# Patient Record
Sex: MALE | Race: WHITE
[De-identification: names, ages, dates, MRNs, and addresses within clinical notes are randomized per-mention and may not be internally consistent; named-entity substitution may affect disease eponyms.]

---

## 2017-02-28 ENCOUNTER — HOSPITAL ENCOUNTER (OUTPATIENT)
Dept: HOSPITAL 39 - GMAJ | Age: 72
Discharge: HOME | End: 2017-02-28
Attending: FAMILY MEDICINE
Payer: MEDICARE

## 2017-02-28 DIAGNOSIS — Z12.5: Primary | ICD-10-CM

## 2017-04-28 ENCOUNTER — HOSPITAL ENCOUNTER (OUTPATIENT)
Dept: HOSPITAL 39 - CT | Age: 72
Discharge: HOME | End: 2017-04-28
Attending: NURSE PRACTITIONER
Payer: MEDICARE

## 2017-04-28 DIAGNOSIS — C34.90: Primary | ICD-10-CM

## 2017-04-28 NOTE — MRI
EXAM DESCRIPTION:



Brain w/wo Contrast





CLINICAL HISTORY:



72 years,Male,MALIGNANT NEOPLASM OF UNSPECIFIED BRONCHUS OR LUNG





COMPARISON:



None





TECHNIQUE:



MRI performed multiple sequences of the brain with and without

gadolinium contrast .





FINDINGS:



There is no abnormal extra-axial fluid collection, hemorrhage, or

enhancing masses.



Gray-white matter differentiation is unremarkable for age. No

enhancing masses. Sulcation and ventricles are age appropriate.



The included paranasal sinuses and mastoid air cells, are

unremarkable. 



Diffusion demonstrates no acute findings.



There is a 2.7 cm subcutaneous cyst seen in the left of midline

of the upper neck





IMPRESSION:



Unremarkable MRI of the brain.



Simple cyst seen in upper left to the midline of the upper neck



Electronically signed by:  Anastacio Banerjee MD  4/28/2017 1:35 PM CDT

## 2017-04-28 NOTE — CT
EXAM DESCRIPTION:



Abdomen/Pelvis w/Contrast



CLINICAL HISTORY:



72 years,Male,MALIGNANT NEOPLASM OF UNSPECIFIED PART OF

UNSPECIFIED LUNG



COMPARISON:



None





TECHNIQUE:



Multiple axial tomographic images were obtained of the abdomen

and pelvis with IV contrast and no oral contrast. Then

reconstructed in sagittal and coronal planes. 





FINDINGS:



The kidneys multiple cysts in both kidneys over 10 in each. Some

are too small to characterize. But some larger ones of the 3 cm

in size in the right kidney and 2.1 cm in the left kidney. A tiny

3 mm stone in the midpole right kidney nonobstructing. The

adrenal glands demonstrate a small mass and left adrenal gland

measuring 2.2 x 1.7 cm and 36 Hounsfield units. This is seen and

CT of the chest December 9, 2015 and March 10, 2015 and a abdomen

CT from May 27, 2014. And appear unchanged. As well as the renal

cyst appear unchanged.. 



The spleen is unremarkable. The liver is unremarkable. 



The pancreas is unremarkable.  The gallbladder is unremarkable. 



The included bowel is demonstrates a moderate stool otherwise

unremarkable.



The appendix is unremarkable.



There is no free air, free fluid, masses, or significant

adenopathy. 



Surrounding soft tissues and bony elements unremarkable. 



Lung bases on the very first exam demonstrate an area about 2 cm

in size and interstitial changes in the posterior left base.



Prostate is enlarged to 7 x 6.8 x 7.4 cm in height.





IMPRESSION:



Left adrenal mass which is stable for several years and most

likely adenoma.



Multiple renal cysts stable and a small right renal stone

nonobstructing.



Probable mild constipation and severe prostate hypertrophy.



Partially included in the exam is a small by area of the

interstitial changes in the posterior left lung base. This could

be anything from infiltrate, scarring or even neoplasm. Recommend

follow-up







Electronically signed by:  Anastacio Banerjee MD  4/28/2017 1:47 PM CDT

## 2017-05-03 ENCOUNTER — HOSPITAL ENCOUNTER (OUTPATIENT)
Dept: HOSPITAL 39 - CT | Age: 72
Discharge: HOME | End: 2017-05-03
Attending: NURSE PRACTITIONER
Payer: MEDICARE

## 2017-05-03 DIAGNOSIS — C34.90: Primary | ICD-10-CM

## 2017-05-03 NOTE — CT
PROCEDURE:  Chest w/Contrast



CLINICAL HISTORY:  72 years  Male  MALIGNANT NEOPLASM OF

UNSPECIFIED PART OF UNSPECIFIED BRONCHUS. The study is for

follow-up of previously visualized lung lesion.



COMPARISON:  CT chest study from 11/17/2016.



TECHNIQUE:  Contiguous axial images obtained through the chest

during the infusion of IV contrast. Reformatted images obtained.



This exam was performed according to our department optimization

program which includes automated exposure control, adjustment of

the mA and/or kv according to patient size and/or use of

iterative reconstruction technique.   



FINDINGS:



Stable left adrenal gland nodule measuring approximately 2.2 cm

in diameter. Stable renal cysts. Nonobstructing renal

calcifications which could be vascular.



Left chest port tip in the SVC.



Coronary artery calcifications/stents. Changes from previous

sternotomy.



The mediastinum appears unremarkable.



Atherosclerotic calcifications in the aorta and its branches.



There is a solid lesion in the left lower lung measuring

approximately 2.1 x 1.7 cm x 1.8 cm. This compares with

measurements on the previous study of approximately 1.3 x 1 x 1.8

cm. There is a small amount of surrounding interstitial

infiltrate with mild cystic change in the adjacent lung.



Degenerative changes in the spine.



IMPRESSION:



Solid lesion in the left lower lobe of the lung which is slightly

larger in size compared to the previous study. The findings

consistent with malignancy. No definite enlarged hilar or

mediastinal lymph nodes.



Stable left adrenal gland nodular lesion.



Electronically signed by:  Ivan Rebolledo MD  5/3/2017 5:00 PM CDT

## 2017-06-01 ENCOUNTER — HOSPITAL ENCOUNTER (OUTPATIENT)
Dept: HOSPITAL 39 - AMB | Age: 72
Discharge: HOME | End: 2017-06-01
Attending: SURGERY
Payer: MEDICARE

## 2017-06-01 VITALS — DIASTOLIC BLOOD PRESSURE: 76 MMHG | SYSTOLIC BLOOD PRESSURE: 128 MMHG | OXYGEN SATURATION: 100 %

## 2017-06-01 VITALS — TEMPERATURE: 97 F

## 2017-06-01 DIAGNOSIS — N40.0: ICD-10-CM

## 2017-06-01 DIAGNOSIS — I25.10: ICD-10-CM

## 2017-06-01 DIAGNOSIS — Z79.899: ICD-10-CM

## 2017-06-01 DIAGNOSIS — Z85.118: ICD-10-CM

## 2017-06-01 DIAGNOSIS — E78.5: ICD-10-CM

## 2017-06-01 DIAGNOSIS — I25.2: ICD-10-CM

## 2017-06-01 DIAGNOSIS — C77.0: Primary | ICD-10-CM

## 2017-06-01 DIAGNOSIS — K21.9: ICD-10-CM

## 2017-06-01 DIAGNOSIS — Z95.1: ICD-10-CM

## 2017-06-01 PROCEDURE — 88341 IMHCHEM/IMCYTCHM EA ADD ANTB: CPT

## 2017-06-01 PROCEDURE — 88360 TUMOR IMMUNOHISTOCHEM/MANUAL: CPT

## 2017-06-01 PROCEDURE — 88305 TISSUE EXAM BY PATHOLOGIST: CPT

## 2017-06-01 PROCEDURE — 80320 DRUG SCREEN QUANTALCOHOLS: CPT

## 2017-06-01 PROCEDURE — 00320 ANES ALL PX NECK NOS 1YR/>: CPT

## 2017-06-01 PROCEDURE — 36415 COLL VENOUS BLD VENIPUNCTURE: CPT

## 2017-06-01 PROCEDURE — 80053 COMPREHEN METABOLIC PANEL: CPT

## 2017-06-01 PROCEDURE — 93005 ELECTROCARDIOGRAM TRACING: CPT

## 2017-06-01 PROCEDURE — 71020: CPT

## 2017-06-01 PROCEDURE — 38500 BIOPSY/REMOVAL LYMPH NODES: CPT

## 2017-06-01 PROCEDURE — 81001 URINALYSIS AUTO W/SCOPE: CPT

## 2017-06-01 PROCEDURE — 88342 IMHCHEM/IMCYTCHM 1ST ANTB: CPT

## 2017-06-01 PROCEDURE — 85025 COMPLETE CBC W/AUTO DIFF WBC: CPT

## 2017-06-01 NOTE — OP
DATE OF PROCEDURE:  06/01/17



PREOPERATIVE DIAGNOSIS: 

1.  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 
the lung.



POSTOPERATIVE DIAGNOSIS: 

1.  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 
the left.



PROCEDURE: 

1.  Excision, left supraclavicular lymph node.



SURGEON:  Donald A. Behr, MD.



ASSISTANT:  None.



ANESTHESIA:  Local infiltration of 1% lidocaine with bicarb and IV sedation by 
Anesthesia.



INDICATION:  The patient is a 72-year-old male who developed small cell 
carcinoma several years ago.  He was treated.  It was felt to have recurred and 
placed on hospice.  However, there was no significant progression of disease 
and he has been followed by Dr. Garcia.  He has now developed left 
supraclavicular lymphadenopathy.  He was brought to the Surgical Suite today 
for excision of same after the risks, benefits and alternatives to the 
procedure were discussed and accepted.  The procedure is to delineate whether 
this is small cell or if it has become a non-small cell carcinoma.  



FINDINGS:  A single firm mass consistent with lymphadenopathy was removed.  
Pathology is pending.



PROCEDURE:  After the patient was brought t the Surgical Suite and placed in 
supine position, he was prepped and draped in the usual sterile manner.  
Surgical time-out was taken.  IV sedation was performed.  The left 
supraclavicular area was identified and incision was made first with a marking 
pen and then with infiltration of anesthesia.  The skin was incised with a 
sharp knife.  Subcutaneous tissue and platysma were divided using electrocautery
, then using blunt dissection, electrocautery, clamps and ligatures of 3-0 
Vicryl, the lymph node was dissected free and sent for pathologic evaluation.  
The wound was irrigated with saline.  Hemostasis was noted to be adequate.  The 
platysma was then closed with interrupted 3-0 Vicryl sutures.  The skin edges 
were approximated with 4-0 Vicryl subcuticular sutures, benzoin, and Steri-
Strips.  Sterile dressings were applied.  The patient was awakened and taken to 
the Ambulatory Unit in stable condition.  Estimated blood loss was less than 25 
mL.  All sponge, needle and instrument counts were correct.



#[id number]
[facility]

## 2017-06-01 NOTE — RAD
Procedure:  XR CHEST 2 VIEWS        



Exam Date:  6/1/2017



Ordering Provider:  DONALD BEHR



Clinical Indication:  Pre Op for 6./1/17



Comparison: 5/3/2017 CT chest



Findings: 

Postsurgical changes at the anterior chest wall. Left chest

portacatheter in place.

Cardiac silhouette: Normal

Pulmonary vasculature : Normal

Mediastinal contour: Normal 

Aortic contour: Aortic calcification. 

Focal lung consolidation: No focal lung consolidation. Known left

lower lobe lesion better appreciated on comparison CT.

Pleural effusion: None

Pneumothorax: None

Acute bony or soft tissue abnormality: None



Impression: 

1. No acute abnormalities in the chest.



Electronically signed by:  Hans Lafleur MD  6/1/2017 7:20 AM CDT

Workstation: [id number]

## 2017-09-30 ENCOUNTER — HOSPITAL ENCOUNTER (EMERGENCY)
Dept: HOSPITAL 39 - ER | Age: 72
Discharge: HOME | End: 2017-09-30
Payer: MEDICARE

## 2017-09-30 VITALS — DIASTOLIC BLOOD PRESSURE: 74 MMHG | OXYGEN SATURATION: 97 % | TEMPERATURE: 97.9 F | SYSTOLIC BLOOD PRESSURE: 138 MMHG

## 2017-09-30 DIAGNOSIS — J44.9: ICD-10-CM

## 2017-09-30 DIAGNOSIS — N40.1: Primary | ICD-10-CM

## 2017-09-30 DIAGNOSIS — Z87.891: ICD-10-CM

## 2017-09-30 DIAGNOSIS — C34.90: ICD-10-CM

## 2017-09-30 DIAGNOSIS — R33.8: ICD-10-CM

## 2017-09-30 DIAGNOSIS — I25.2: ICD-10-CM

## 2017-09-30 NOTE — ED.PDOC
History of Present Illness





- General


Chief Complaint:  Problem


Stated Complaint: Unable to urinate


Time Seen by Provider: 17 04:41


Source: patient


Exam Limitations: no limitations





- History of Present Illness


Initial Comments: 





Patient presents with urinary retention since just after midnight. He has a 

history of BPH and takes flomax. He says that he has not had this problem in 

the two years that he has been taking Flomax.  He urinated several times this 

evening with no problems. No dysuria.  He is currently being treated with 

immunotherapy for small cell lung CA.  No other complaints. No hx of surgery on 

the prostate. 


Timing/Duration: 4-6 hours


Severity: moderate


Improving Factors: nothing


Worsening Factors: nothing


Associated Symptoms: denies symptoms


Allergies/Adverse Reactions: 


Allergies





NO KNOWN ALLERGY Allergy (Verified 14 15:03)


 








Home Medications: 


Ambulatory Orders





Gabapentin 1,200 mg PO BID 17 


Tamsulosin HCl [Flomax] 0.4 mg PO BEDTIME 17 











Review of Systems





- Review of Systems


Constitutional: States: no symptoms reported


EENTM: States: no symptoms reported


Respiratory: States: no symptoms reported


Cardiology: States: no symptoms reported


Gastrointestinal/Abdominal: States: no symptoms reported


Genitourinary: States: see HPI


Musculoskeletal: States: no symptoms reported


Skin: States: no symptoms reported


Neurological: States: no symptoms reported





Past Medical History (General)





- Patient Medical History


Hx Seizures: No


Hx Stroke: No


Hx Dementia: No


Hx Asthma: No


Hx of COPD: Yes


Hx Cardiac Disorders: Yes - MI x 5


Hx Congestive Heart Failure: No


Hx Hypertension: No


Hx Thyroid Disease: No


Hx Diabetes: No


Hx Gastroesophageal Reflux: No


Hx Renal Disease: No


Hx Cancer: Yes - lung currently being treated


Hx of HIV: No


Hx MRSA: No


Surgical History: other





- Vaccination History


Hx Tetanus, Diphtheria Vaccination: No


Hx Influenza Vaccination: No


Hx Pneumococcal Vaccination: No


Immunizations Up to Date: Yes





- Social History


Hx Tobacco Use: Yes


Hx Alcohol Use: Yes





Family Medical History





- Family History


  ** Mother


Family History: Unknown


Living Status: 





Physical Exam





- Physical Exam


General Appearance: Alert


Respiratory: lungs clear


Cardiovascular/Chest: normal peripheral pulses, regular rate, rhythm, no edema


Gastrointestinal/Abdominal: normal bowel sounds, non tender, soft


Skin Exam: normal color





Progress





- Progress


Progress: 





17 04:47


Baptiste catheter inserted and 900 cc urine output obtained.  UA unremarkable.


17 05:05


Patient taught to self cath and provided with kit.  follow up with pcp





Departure





- Departure


Clinical Impression: 


 Urinary retention due to benign prostatic hyperplasia





Disposition: Discharge to Home or Self Care


Condition: Good


Departure Forms:  ED Discharge - Pt. Copy, Patient Portal Self Enrollment


Diet: resume usual diet


Activity: increase activity as tolerated


Referrals: 


Gautam Ayala MD [Primary Care Provider] - 1-2 Weeks


Home Medications: 


Ambulatory Orders





Gabapentin 1,200 mg PO BID 17 


Tamsulosin HCl [Flomax] 0.4 mg PO BEDTIME 17 








Additional Instructions: 


Use self-catheter kit as needed. See your primary care physician on monday.

## 2017-11-09 ENCOUNTER — HOSPITAL ENCOUNTER (EMERGENCY)
Dept: HOSPITAL 39 - ER | Age: 72
Discharge: HOME | End: 2017-11-09
Payer: MEDICARE

## 2017-11-09 VITALS — OXYGEN SATURATION: 96 % | SYSTOLIC BLOOD PRESSURE: 103 MMHG | DIASTOLIC BLOOD PRESSURE: 63 MMHG

## 2017-11-09 VITALS — TEMPERATURE: 99.8 F

## 2017-11-09 DIAGNOSIS — Z87.891: ICD-10-CM

## 2017-11-09 DIAGNOSIS — C34.90: ICD-10-CM

## 2017-11-09 DIAGNOSIS — J44.9: ICD-10-CM

## 2017-11-09 DIAGNOSIS — I25.2: ICD-10-CM

## 2017-11-09 DIAGNOSIS — N13.6: Primary | ICD-10-CM

## 2017-11-09 PROCEDURE — 81001 URINALYSIS AUTO W/SCOPE: CPT

## 2017-11-09 PROCEDURE — 85025 COMPLETE CBC W/AUTO DIFF WBC: CPT

## 2017-11-09 PROCEDURE — 36415 COLL VENOUS BLD VENIPUNCTURE: CPT

## 2017-11-09 PROCEDURE — 80053 COMPREHEN METABOLIC PANEL: CPT

## 2017-11-09 PROCEDURE — 74176 CT ABD & PELVIS W/O CONTRAST: CPT

## 2017-11-09 PROCEDURE — 87086 URINE CULTURE/COLONY COUNT: CPT

## 2017-11-09 NOTE — CT
EXAM: Abdoment/Pelvis w/o Contrast



CLINICAL INDICATION: 72-year-old male with RIGHT lower quadrant

pain with rebound.



COMPARISON: 4/20/2017 CT abdomen and pelvis.



EXAMINATION: CT of the abdomen and pelvis was performed without

intravenous or oral contrast. Multiplanar reformatted images were

provided. This exam was performed according to our departmental

dose optimization program which includes use of automated

exposure control, adjustment of the mA and/or kV according to

patient size and/or use of iterative reconstruction technique.



FINDINGS:



Evaluation of solid organ pathology is limited secondary to lack

of intravenous contrast. Within these limitations, the following

observations are made.



Chest: Evaluation through the lung bases reveals no focal

opacity, pleural effusion or pneumothorax. Heart size is within

normal limits. No pericardial effusion.



Abdomen and pelvis: The liver, gallbladder, pancreas, spleen, and

bilateral adrenal glands are within normal limits. Extensive

perinephric, periureteral stranding with moderate right-sided

hydronephrosis secondary to distal obstructing calculus measuring

2 x 3 mm, (series 2, image 54). Superimposed infectious process

cannot be excluded on a noncontrast CT examination.



Trace volume of free fluid within the dependent pelvis.



Bilateral renal cystic type structures are present the largest of

which is present within the inferior pole of the RIGHT kidney

measuring up to 29 mm and the largest lesion present within the

LEFT side kidney measuring 17 mm with Hounsfield units measuring

less than 20 compatible with simple renal cyst.



The vessels reveal atherosclerotic change otherwise normal in

caliber.



No abdominopelvic lymph nodes are noted to be pathologically

enlarged by CT measurement criteria.



The large and small bowel is within normal limits without

abnormal bowel wall thickness or bowel dilation.



No free air. No organizing abdominopelvic fluid collections. The

appendix is within normal limits.



The osseous structures reveal degenerative change. Anterior

sternal and costochondral multihole plate and screw device. Small

bilateral fat-containing inguinal hernia. Extensive enlargement

of the prostate gland measuring 7.4 x 7.1 x 7.6 cm.



IMPRESSION:



1. Extensive perinephric, periureteral stranding with moderate

right-sided hydronephrosis secondary to distal obstructing

calculus measuring 2 x 3 mm.

2. Superimposed infectious process cannot be excluded on a

noncontrast CT examination.

3. Extensive enlargement of the prostate gland measuring 7.4 x

7.1 x 7.6 cm.

4. Trace volume of free fluid present within the dependent

pelvis.

5. The appendix is within normal limits.



Electronically signed by:  Sapna Molina MD  11/9/2017 8:40 PM CST

Workstation: [id number]

## 2017-11-09 NOTE — ED.PDOC
History of Present Illness





- General


Chief Complaint: Abdominal Pain


Stated Complaint: R abdominal pain


Time Seen by Provider: 17 17:37


Information Source: patient, RN notes reviewed, Vital Signs reviewed, family - 

wife


Exam Limitations: no limitations





- History of Present Illness


Initial Comments: 





Patient presents to the ER with c/o RLQ pain with nausea. Had a brief episode 

of pain yesterday but went away. Pain came back worse today around 14:00. Pain 

is stabbing, constant and worse with movement. No urinary symptoms or change in 

bowel habits. No fever or chills. Last at @~14:00.


Abdominal Pain Onset Location: RLQ


Pain Radiation: no radiation


Quality: severe, sharpness, steady


Timing/Duration: 4-6 hours


Improving Factors: nothing


Worsening Factors: movement


Associated Symptoms: nausea/vomiting





Review of Systems





- Review of Systems


Constitutional: States: no symptoms reported


Respiratory: States: no symptoms reported


Cardiology: States: no symptoms reported


Gastrointestinal/Abdominal: States: see HPI, abdominal pain, constipation, 

nausea.  Denies: diarrhea, vomiting


Genitourinary: States: no symptoms reported.  Denies: dysuria, frequency, pain


Musculoskeletal: States: no symptoms reported


Skin: States: no symptoms reported


Neurological: States: no symptoms reported


All other Systems: No Change from Baseline





Past Medical History (General)





- Patient Medical History


Hx Seizures: No


Hx Stroke: No


Hx Dementia: No


Hx Asthma: No


Hx of COPD: Yes


Hx Cardiac Disorders: Yes - MI x 5


Hx Congestive Heart Failure: No


Hx Hypertension: No


Hx Thyroid Disease: No


Hx Diabetes: No


Hx Gastroesophageal Reflux: No


Hx Renal Disease: No


Hx Cancer: Yes - lung currently being treated


Hx of HIV: No


Hx MRSA: No





- Vaccination History


Hx Tetanus, Diphtheria Vaccination: No


Hx Influenza Vaccination: No


Hx Pneumococcal Vaccination: No





- Social History


Hx Tobacco Use: Yes


Hx Alcohol Use: Yes





Family Medical History





- Family History


  ** Mother


Family History: Unknown


Living Status: 





Physical Exam





- Physical Exam


General Appearance: Alert, Ill Appearing, Well Developed, Well Groomed, Well 

Hydrated, Well Nourished


Neck: supple, normal inspection


Respiratory: lungs clear, normal breath sounds, no respiratory distress, no 

accessory muscle use


Cardiovascular/Chest: regular rate, rhythm, no gallop, no JVD, no murmur


Gastrointestinal/Abdominal: soft, abnormal bowel sounds - Hypoactive, rebound - 

RLQ, tenderness - RLQ


Extremity: normal range of motion, normal inspection


Neurologic: alert, normal mood/affect, oriented x 3


Skin Exam: normal color, warm/dry


Comments: 





 Vital Signs











  17





  17:50


 


Temperature 100.2 F H


 


Pulse Rate [ 80





Left Radial] 


 


Respiratory 22





Rate 


 


Blood Pressure 153/86





[Right Arm] 














Progress





- Progress


Progress: 





17 21:22


Pain relieved after Morphine 5mg IV


Awaiting eval by Hospitalist


17 21:28


Deisi Benitez NP discussed with Dr. Azevedo (Urologist) who would like patient to 

have Toradol 30mg IV, Rocephin 1gm IV, Cipro 500mg PO and sent home with 

Toradol and Tylenol #3 and follow up tomorrow @ 08:00 with Dr. Askew.





- Results/Orders


Results/Orders: 





CT showed 2X3mm obstructing R ureteral stone with moderate hydronephrosis and 

extensive perinephric and periureteral stranding. With extensively enlarge 

prostate @ 7.4X7.1X7.6cm








 Laboratory Tests











  17





  18:50 18:50 20:35


 


WBC  8.5  


 


RBC  3.94 L  


 


Hgb  13.1 L  


 


Hct  38.2 L  


 


MCV  96.9 H  


 


MCH  33.2 H  


 


MCHC  34.4  


 


RDW  14.0  


 


Plt Count  184  


 


MPV  7.6  


 


Absolute Neuts (auto)  7.00 H  


 


Absolute Lymphs (auto)  0.50 L  


 


Absolute Monos (auto)  0.90 H  


 


Absolute Eos (auto)  0.10  


 


Absolute Basos (auto)  0.00  


 


Neutrophils %  82.0 H  


 


Lymphocytes %  6.1 L  


 


Monocytes %  10.8 H  


 


Eosinophils %  0.7 L  


 


Basophils %  0.4  


 


Sodium   133 L 


 


Potassium   4.4 


 


Chloride   103 


 


Carbon Dioxide   21 


 


Anion Gap   13.4 


 


BUN   21 H 


 


Creatinine   1.54 H 


 


BUN/Creatinine Ratio   13.6 


 


Random Glucose   114 H 


 


Serum Osmolality   270.2 L 


 


Calcium   9.5 


 


Total Bilirubin   0.8 


 


AST   17 


 


ALT   < 8 L 


 


Alkaline Phosphatase   69 


 


Serum Total Protein   6.8 


 


Albumin   3.9 


 


Globulin   2.9 


 


Albumin/Globulin Ratio   1.3 


 


Urine Color    Yellow


 


Urine Appearance    Cloudy


 


Urine pH    6.5


 


Ur Specific Gravity    1.020


 


Urine Protein    30


 


Urine Glucose (UA)    Negative


 


Urine Ketones    15 H


 


Urine Blood    Moderate H


 


Urine Nitrite    Negative


 


Urine Bilirubin    Negative


 


Urine Urobilinogen    0.2


 


Ur Leukocyte Esterase    Large H


 


Urine RBC    20-30 H


 


Urine WBC    Tntc H


 


Ur Epithelial Cells    0


 


Urine Bacteria    3+ H














- EKG/XRAY/CT


CT Ordered: Yes





Departure





- Departure


Clinical Impression: 


 Right ureteral stone, Pyelonephritis, acute, Hydronephrosis due to obstruction 

of ureter





Time of Disposition: 22:06


Disposition: Discharge to Home or Self Care


Condition: Good


Departure Forms:  ED Discharge - Pt. Copy, Patient Portal Self Enrollment


Instructions:  DI for Kidney Stones, DI for Kidney Infection


Diet: resume usual diet


Activity: increase activity as tolerated


Referrals: 


ANTHONY ASKEW MD [Referring] - 11/10/17 8:00 am


Home Medications: 


Ambulatory Orders





Gabapentin 1,200 mg PO TID 17 


Tamsulosin HCl [Flomax] 0.4 mg PO BEDTIME 17

## 2017-11-15 ENCOUNTER — HOSPITAL ENCOUNTER (INPATIENT)
Dept: HOSPITAL 39 - ER | Age: 72
LOS: 2 days | Discharge: HOME | DRG: 690 | End: 2017-11-17
Attending: NURSE PRACTITIONER | Admitting: INTERNAL MEDICINE
Payer: MEDICARE

## 2017-11-15 DIAGNOSIS — J44.9: ICD-10-CM

## 2017-11-15 DIAGNOSIS — I25.2: ICD-10-CM

## 2017-11-15 DIAGNOSIS — I25.10: ICD-10-CM

## 2017-11-15 DIAGNOSIS — Z95.1: ICD-10-CM

## 2017-11-15 DIAGNOSIS — Z95.5: ICD-10-CM

## 2017-11-15 DIAGNOSIS — R53.1: ICD-10-CM

## 2017-11-15 DIAGNOSIS — N13.6: Primary | ICD-10-CM

## 2017-11-15 DIAGNOSIS — F17.210: ICD-10-CM

## 2017-11-15 DIAGNOSIS — Z92.21: ICD-10-CM

## 2017-11-15 DIAGNOSIS — Z79.899: ICD-10-CM

## 2017-11-15 DIAGNOSIS — C34.32: ICD-10-CM

## 2017-11-15 DIAGNOSIS — E86.0: ICD-10-CM

## 2017-11-15 DIAGNOSIS — N32.0: ICD-10-CM

## 2017-11-15 DIAGNOSIS — N40.1: ICD-10-CM

## 2017-11-15 RX ADMIN — ENOXAPARIN SODIUM SCH MG: 40 INJECTION, SOLUTION INTRAVENOUS; SUBCUTANEOUS at 20:59

## 2017-11-15 RX ADMIN — IPRATROPIUM BROMIDE AND ALBUTEROL SULFATE SCH ML: .5; 3 SOLUTION RESPIRATORY (INHALATION) at 20:13

## 2017-11-15 RX ADMIN — IPRATROPIUM BROMIDE AND ALBUTEROL SULFATE SCH ML: .5; 3 SOLUTION RESPIRATORY (INHALATION) at 16:53

## 2017-11-15 RX ADMIN — ENOXAPARIN SODIUM SCH: 40 INJECTION, SOLUTION INTRAVENOUS; SUBCUTANEOUS at 21:06

## 2017-11-15 RX ADMIN — AMPICILLIN SODIUM SCH MLS/HR: 2 INJECTION, POWDER, FOR SOLUTION INTRAVENOUS at 16:07

## 2017-11-15 RX ADMIN — TAMSULOSIN HYDROCHLORIDE SCH MG: 0.4 CAPSULE ORAL at 21:00

## 2017-11-15 RX ADMIN — Medication PRN ML: at 11:01

## 2017-11-15 RX ADMIN — POTASSIUM CHLORIDE AND SODIUM CHLORIDE PRN MLS/HR: 900; 150 INJECTION, SOLUTION INTRAVENOUS at 16:07

## 2017-11-15 RX ADMIN — AMPICILLIN SODIUM SCH MLS/HR: 2 INJECTION, POWDER, FOR SOLUTION INTRAVENOUS at 21:56

## 2017-11-15 RX ADMIN — CEFTRIAXONE SCH MLS/HR: 1 INJECTION, POWDER, FOR SOLUTION INTRAMUSCULAR; INTRAVENOUS at 23:29

## 2017-11-15 RX ADMIN — GABAPENTIN SCH MG: 300 CAPSULE ORAL at 21:00

## 2017-11-15 NOTE — RAD
EXAM DESCRIPTION: 



Chest,1 View



CLINICAL HISTORY: 



MALAISE, H/O LUNG CANCER



COMPARISON: 



May 31, 2017



IMPRESSION: 



Single AP portable upright view of the chest shows cardiac

silhouette and pulmonary vasculature to be within normal limits.

Left subclavian Mediport remains in good positioning.

Postsurgical changes to the anterior chest wall are noted.

Mild elevation of the left hemidiaphragm is again seen.

Lungs are normally aerated and clear.

Right costophrenic angle is not included in the field-of-view.

No obvious pleural effusion or pneumothorax is seen.



Question left age-indeterminate eighth rib fracture versus

overlapping densities.



Electronically signed by:  Amado Osborn MD  11/15/2017 11:03 AM CST

Workstation: [id number]

## 2017-11-15 NOTE — US
EXAM DESCRIPTION: 



Renal



CLINICAL HISTORY: 



right pyelo with hydronephrosis-stone



COMPARISON: 



None.



FINDINGS: 



There are multiple cysts in each kidney. Stones are seen in each

kidney. The largest 2 cysts on the right measures 33 x 29 x 30 mm

and 28 x 30 x 30 mm. The largest cyst on the left measures 15 x

15 x 18 mm.



Largest stone in the right kidney measures 5 mm and largest

stones in the left kidney measure 7 mm, 8 mm, and 6 mm.



There is no hydronephrosis on either side.



The right kidney measures  10.5 x 7.2 x 6.9 cm.



The left kidney measures  10.7 x 6.0 x 5.2 cm.



IMPRESSION: 



Bilateral cysts and bilateral stones. No evidence of

hydronephrosis.



Electronically signed by:  Mark Halsted  11/15/2017 6:04 PM [facility]

Workstation: [id number]

## 2017-11-15 NOTE — HP
HISTORY OF PRESENT ILLNESS:  This 72 year-old white male is admitted to the 
hospital from the Emergency Room because of worsening symptoms of malaise and 
weakness.  He was diagnosed with right sided pyelonephritis with a urinary 
tract infection last Friday and was started on Amoxicillin about 4 or 5 doses 
before his current admission.  He has been steadily getting worse since the 
onset of his symptoms with temperatures of 101 at home, some nausea, decreased 
appetite.  He saw Dr. Azevedo for a ureteral stone who felt that he would be able 
to pass the 2 x 3 mm stone without procedures.  He has been getting weaker.  
The pain has eased up after some morphine was given to him last Friday in the 
Emergency Room.  In the Emergency Room he did have some pyuria on the 
urinalysis.  Evidence of some mild dehydration is noted.  History of lung 
cancer for the last 3 years currently under infusion therapy out of the [facility].



PAST MEDICAL HISTORY: 

1.   He has had 5 myocardial infarctions.

2.   Coronary stents placed in 3 locations.

3.   Bypass coronary surgery on 3 vessels.

4.   Left lower lobe lung cancer as well.  Last CT scan was about 6 months ago.



PAST SURGICAL HISTORY:

1.   Coronary artery bypass grafting to 3 vessels.



CURRENT MEDICATIONS:  Please refer to nurses' notes for a list of verified home 
medications.



ALLERGIES:  NONE.



FAMILY HISTORY:  Positive for cancer and heart disease.



SOCIAL HISTORY:  He has worked as a repairman and a .  He has smoked for 
approximately 55 years and is currently down to about a pack of cigarettes a 
day with most recently smoked this afternoon.



REVIEW OF SYSTEMS:  Some weight loss recently.  Low-grade fever and chills also 
present.  He has marked cold intolerance.

HEENT:  Hearing and vision appear to be fairly good.

LUNGS:  Occasional shortness of breath, occasional cough.  No hemoptysis.

CARDIOVASCULAR:  No significant chest pains or palpitations.

GASTROINTESTINAL:  Decreased appetite with some mild nausea.  No blood in the 
stools.

GENITOURINARY:  No burning upon urination.

NEUROLOGIC:  No focal weakness.



PHYSICAL EXAMINATION: 



VITAL SIGNS:  At the present time he is afebrile though he did have a 
temperature of 101 at home.  Pulse 57, blood pressure 123/65, room air 
saturation is down to 90 or up to 100% on room air.  Weight is 74.9 kilos on 
the bed scale.



GENERAL:  The patient is awake, alert and oriented, and communicative.  Family 
is also present to assist.



HEENT:  Unremarkable.



NECK:  Supple.



CHEST:  Lungs have some diminished breath sounds especially in the left base.  
Occasional rhonchi bilaterally in the lateral and inferior lung fields.



CARDIOVASCULAR:  Heart tones regular without any significant gallops.



ABDOMEN:  Soft with no organomegaly, masses or tenderness.



EXTREMITIES:  Fairly well formed.



NEUROLOGIC:  No focal neurological deficits.  The patient is awake, alert and 
oriented, and communicative.



LABORATORY STUDIES:  White count 6,000, hemoglobin 11.8 with a hyperchromic 
macrocytic presentation.  INR of 1.07.  Chemistry shows potassium 3.7, BUN 29, 
creatinine 1.13, glucose 98, lactic acid 1.1.  Liver enzymes were normal except 
AST of 48.  Troponin 0.02, albumin 3.3.  Urinalysis showed trace of leukocyte 
esterase and 3 to 5 WBCs.  Blood cultures are pending.  Urine culture from the 
last Emergency Room visit about 5 days ago shows and Enterococcus faecalis, 
vancomycin and Ampicillin sensitive.  



ASSESSMENT: 

1.   Acute pyelonephritis right kidney with radiographic evidence of the 
infection with 

      associated obstructing ureteral stone with associated hydroureter and 

      hydronephrosis on the right.  Failed outpatient therapy on oral 
Ampicillin requiring

      parenteral therapy to assist with treatment of a potential complication 
with an 

      obstructive uropathy.

2.   Marked weakness, rule out hypothyroidism or electrolyte disturbance or 
associated

      to the infection.

3.   History of left lower lobe lung cancer under chemotherapy by infusion at 
this time

      present since 2014.

4.   Moderate dehydration requiring fluid supplementation.

5.   Fever with temperature 101 degrees at home.

6.   History of benign prostatic hypertrophy with mild bladder outlet 
obstructive 

      symptoms noted.

7.   Coronary artery disease with history of myocardial infarctions.



PLAN:  The patient will be admitted to the hospital for initiation of 
antibiotic therapy after cultures were obtained.  He will be continued on the 
Rocephin which will supplement the high dose Ampicillin 2 grams every 6 hours.  
Close observation.  Steadily increase his activity level.  Close followup with 
Dr. Ayala when clinically safe to return home.



#[id number]
[facility]

## 2017-11-15 NOTE — ED.PDOC
History of Present Illness





- General


Chief Complaint:  Problem


Stated Complaint: urinary infection


Time Seen by Provider: 11/15/17 10:19


Source: patient, family


Exam Limitations: no limitations





- History of Present Illness


Initial Comments: 





PT PRESENTS TO THE ED WITH COMPLAINTS OF GENERALIZED MALAISE AND GENERALIZED 

WEAKNESS AFTER BEING DIAGNOSED WITH UTI AND URETERAL STONE LAST WEEK.  PT IS ON 

DAY 3 OF ANTIBIOTICS FOR UTI WITH NO IMPROVEMENT IN SYMPTOMS.  PT ALSO HAS 

HISTORY OF LUNG CANCER SINCE  AND IS UNDERGOING INFUSION THERAPY.  


Timing/Duration: 1 week


Severity: moderate


Improving Factors: nothing


Worsening Factors: nothing


Associated Symptoms: fever/chills, malaise, weakness


Allergies/Adverse Reactions: 


Allergies





NO KNOWN ALLERGY Allergy (Verified 11/15/17 10:24)


 








Home Medications: 


Ambulatory Orders





Gabapentin 600 mg PO TID 17 


Tamsulosin HCl [Flomax] 0.4 mg PO BEDTIME 17 


Amoxicillin 500 mg PO Q8H 11/15/17 











Review of Systems





- Review of Systems


Constitutional: States: fever, malaise, weakness.  Denies: chills


EENTM: Denies: nose congestion, throat pain


Respiratory: Denies: cough, short of breath


Cardiology: Denies: chest pain, palpitations, syncope


Gastrointestinal/Abdominal: States: abdominal pain - LAST WEEK PRIOR TO 

DIAGNOSIS.  Denies: nausea, vomiting


Genitourinary: Denies: dysuria, frequency, hematuria


Musculoskeletal: Denies: joint pain, muscle pain


Skin: Denies: dryness, lesions


Neurological: Denies: headache, numbness, paresthesia


Endocrine: States: no symptoms reported


Hematologic/Lymphatic: Denies: easy bruising, swollen glands





Past Medical History (General)





- Patient Medical History


Hx Seizures: No


Hx Stroke: No


Hx Dementia: No


Hx Asthma: No


Hx of COPD: Yes


Hx Cardiac Disorders: Yes - MI x 5


Hx Congestive Heart Failure: No


Hx Hypertension: No


Hx Thyroid Disease: No


Hx Diabetes: No


Hx Gastroesophageal Reflux: No


Hx Renal Disease: No


Hx Cancer: Yes - lung currently being treated


Hx of HIV: No


Hx MRSA: No


Surgical History: coronary bypass surgery, other





- Vaccination History


Hx Tetanus, Diphtheria Vaccination: No


Hx Influenza Vaccination: No


Hx Pneumococcal Vaccination: No





- Social History


Hx Tobacco Use: Yes


Hx Alcohol Use: Yes


Hx Substance Use: No





Family Medical History





- Family History


  ** Mother


Family History: Unknown


Living Status: 





Physical Exam





- Physical Exam


General Appearance: Alert, Frail, No apparent distress


Ears, Nose, Throat: hearing grossly normal, normal ENT inspection, normal 

pharynx


Neck: non-tender, full range of motion, supple


Respiratory: chest non-tender, lungs clear, normal breath sounds, no 

respiratory distress


Cardiovascular/Chest: normal peripheral pulses, regular rate, rhythm, no edema


Gastrointestinal/Abdominal: normal bowel sounds, non tender, soft, no 

organomegaly


Back Exam: normal inspection, no CVA tenderness


Extremity: normal range of motion, non-tender, normal inspection


Neurologic: alert, normal mood/affect, oriented x 3


Skin Exam: normal color, warm/dry


Lymphatic: no adenopathy





Progress





- Results/Orders


Results/Orders: 





 





11/15/17 10:31


IV Care:Saline Lock per Protoc QSHIFT 


Telemetry .ONCE 


Sodium Chloride 0.9% (Flush) [Saline Flush Syringe]   10 ml IV PRN PRN 


EKG Stat 


Pulse Ox Stat 


Pulse Oximetry Assessment DAILY 





11/15/17 11:27


BLOOD CULTURE Stat 





11/15/17 12:24


URINE CULTURE W/COLONY COUNT Stat 








 Laboratory Results - last 24 hr











  11/15/17 11/15/17 11/15/17





  10:55 10:55 10:55


 


WBC   6.0 


 


RBC   3.57 L 


 


Hgb   11.8 L 


 


Hct   34.5 L 


 


MCV   96.7 H 


 


MCH   33.0 H 


 


MCHC   34.2 


 


RDW   14.2 


 


Plt Count   160 


 


MPV   8.5 


 


Absolute Neuts (auto)   4.80 


 


Absolute Lymphs (auto)   0.50 L 


 


Absolute Monos (auto)   0.60 


 


Absolute Eos (auto)   0.10 


 


Absolute Basos (auto)   0.00 


 


Neutrophils %   79.1 H 


 


Lymphocytes %   8.9 L 


 


Monocytes %   10.6 H 


 


Eosinophils %   1.0 


 


Basophils %   0.4 


 


PT    12.1


 


INR    1.070


 


PTT (SP)    27.1


 


Sodium  137  


 


Potassium  3.7  


 


Chloride  107  


 


Carbon Dioxide  24  


 


Anion Gap  9.7 L  


 


BUN  29 H  


 


Creatinine  1.13  


 


BUN/Creatinine Ratio  25.7 H  


 


Random Glucose  98  


 


Serum Osmolality  279.6  


 


Lactic Acid   


 


Calcium  9.7  


 


Total Bilirubin  0.7  


 


AST  48 H  


 


ALT  22  


 


Alkaline Phosphatase  59  


 


Creatine Kinase  75  


 


CK-MB (CK-2)  2.2  


 


CK-MB (CK-2) %  Not Reportable  


 


Troponin I  0.02  


 


Serum Total Protein  6.6  


 


Albumin  3.3  


 


Globulin  3.3  


 


Albumin/Globulin Ratio  1.0 L  


 


Urine Color   


 


Urine Appearance   


 


Urine pH   


 


Ur Specific Gravity   


 


Urine Protein   


 


Urine Glucose (UA)   


 


Urine Ketones   


 


Urine Blood   


 


Urine Nitrite   


 


Urine Bilirubin   


 


Urine Urobilinogen   


 


Ur Leukocyte Esterase   


 


Urine RBC   


 


Urine WBC   


 


Ur Epithelial Cells   


 


Urine Bacteria   














  11/15/17 11/15/17





  10:55 12:24


 


WBC  


 


RBC  


 


Hgb  


 


Hct  


 


MCV  


 


MCH  


 


MCHC  


 


RDW  


 


Plt Count  


 


MPV  


 


Absolute Neuts (auto)  


 


Absolute Lymphs (auto)  


 


Absolute Monos (auto)  


 


Absolute Eos (auto)  


 


Absolute Basos (auto)  


 


Neutrophils %  


 


Lymphocytes %  


 


Monocytes %  


 


Eosinophils %  


 


Basophils %  


 


PT  


 


INR  


 


PTT (SP)  


 


Sodium  


 


Potassium  


 


Chloride  


 


Carbon Dioxide  


 


Anion Gap  


 


BUN  


 


Creatinine  


 


BUN/Creatinine Ratio  


 


Random Glucose  


 


Serum Osmolality  


 


Lactic Acid  1.1 


 


Calcium  


 


Total Bilirubin  


 


AST  


 


ALT  


 


Alkaline Phosphatase  


 


Creatine Kinase  


 


CK-MB (CK-2)  


 


CK-MB (CK-2) %  


 


Troponin I  


 


Serum Total Protein  


 


Albumin  


 


Globulin  


 


Albumin/Globulin Ratio  


 


Urine Color   Yellow


 


Urine Appearance   Clear


 


Urine pH   5.5


 


Ur Specific Gravity   1.020


 


Urine Protein   Trace


 


Urine Glucose (UA)   Negative


 


Urine Ketones   Negative


 


Urine Blood   Negative


 


Urine Nitrite   Negative


 


Urine Bilirubin   Negative


 


Urine Urobilinogen   0.2


 


Ur Leukocyte Esterase   Trace H


 


Urine RBC   0


 


Urine WBC   3-5 H


 


Ur Epithelial Cells   0


 


Urine Bacteria   Rare














- EKG/XRAY/CT


EKG: Sinus - @61BPM, no ST T wave changes, Unchanged from - 17


Comments: NONSPECIFIC INTRAVENTRICULAR BLOCK, NL AXIS


XRAY: chest - NO ACUTE DISEASE





Departure





- Departure


Clinical Impression: 


 Acute pyelonephritis, Ureteral calculi, Dehydration, Failure of outpatient 

treatment, Generalized muscle weakness





Time of Disposition: 13:35


Disposition: Admit Patient


Condition: Fair


Departure Forms:  ED Discharge - Pt. Copy, Patient Portal Self Enrollment


Referrals: 


Gautam Ayala MD [Primary Care Provider] - 1-2 Weeks


Home Medications: 


Ambulatory Orders





Gabapentin 600 mg PO TID 17 


Tamsulosin HCl [Flomax] 0.4 mg PO BEDTIME 17 


Amoxicillin 500 mg PO Q8H 11/15/17 











Decision To Admit





- Decistion To Admit


Decision to Admit Reason: Admit from ER - GENERALIZED MUSCLE WEAKNESS, 

PYELONEPHRITIS, FAILURE OF OUTPATIENT TREATMENT


Decision to Admit Date: 11/15/17 - CASE DISCUSSED WITH DR. RUSSO WHO AGREES 

TO ADMIT PATIENT


Decision to Admit Time: 13:36

## 2017-11-16 RX ADMIN — POTASSIUM CHLORIDE AND SODIUM CHLORIDE PRN MLS/HR: 900; 150 INJECTION, SOLUTION INTRAVENOUS at 07:16

## 2017-11-16 RX ADMIN — AMPICILLIN SODIUM SCH MLS/HR: 2 INJECTION, POWDER, FOR SOLUTION INTRAVENOUS at 15:31

## 2017-11-16 RX ADMIN — ALUMINUM ZIRCONIUM TRICHLOROHYDREX GLY SCH MG: 0.2 STICK TOPICAL at 16:23

## 2017-11-16 RX ADMIN — ENOXAPARIN SODIUM SCH: 40 INJECTION, SOLUTION INTRAVENOUS; SUBCUTANEOUS at 21:00

## 2017-11-16 RX ADMIN — Medication PRN ML: at 22:25

## 2017-11-16 RX ADMIN — AMPICILLIN SODIUM SCH MLS/HR: 2 INJECTION, POWDER, FOR SOLUTION INTRAVENOUS at 04:17

## 2017-11-16 RX ADMIN — IPRATROPIUM BROMIDE AND ALBUTEROL SULFATE SCH ML: .5; 3 SOLUTION RESPIRATORY (INHALATION) at 08:45

## 2017-11-16 RX ADMIN — AMPICILLIN SODIUM SCH MLS/HR: 2 INJECTION, POWDER, FOR SOLUTION INTRAVENOUS at 22:20

## 2017-11-16 RX ADMIN — IPRATROPIUM BROMIDE AND ALBUTEROL SULFATE SCH ML: .5; 3 SOLUTION RESPIRATORY (INHALATION) at 16:45

## 2017-11-16 RX ADMIN — GABAPENTIN SCH MG: 300 CAPSULE ORAL at 20:42

## 2017-11-16 RX ADMIN — TAMSULOSIN HYDROCHLORIDE SCH MG: 0.4 CAPSULE ORAL at 20:42

## 2017-11-16 RX ADMIN — CEFTRIAXONE SCH MLS/HR: 1 INJECTION, POWDER, FOR SOLUTION INTRAMUSCULAR; INTRAVENOUS at 10:37

## 2017-11-16 RX ADMIN — IPRATROPIUM BROMIDE AND ALBUTEROL SULFATE SCH ML: .5; 3 SOLUTION RESPIRATORY (INHALATION) at 12:29

## 2017-11-16 RX ADMIN — POTASSIUM CHLORIDE AND SODIUM CHLORIDE PRN MLS/HR: 900; 150 INJECTION, SOLUTION INTRAVENOUS at 20:46

## 2017-11-16 RX ADMIN — GABAPENTIN SCH MG: 300 CAPSULE ORAL at 15:24

## 2017-11-16 RX ADMIN — ENOXAPARIN SODIUM SCH MG: 40 INJECTION, SOLUTION INTRAVENOUS; SUBCUTANEOUS at 20:42

## 2017-11-16 RX ADMIN — OMEPRAZOLE SCH MG: 20 CAPSULE, DELAYED RELEASE ORAL at 06:31

## 2017-11-16 RX ADMIN — GABAPENTIN SCH MG: 300 CAPSULE ORAL at 08:10

## 2017-11-16 RX ADMIN — CEFTRIAXONE SCH MLS/HR: 1 INJECTION, POWDER, FOR SOLUTION INTRAMUSCULAR; INTRAVENOUS at 23:27

## 2017-11-16 RX ADMIN — Medication PRN ML: at 22:21

## 2017-11-16 RX ADMIN — IPRATROPIUM BROMIDE AND ALBUTEROL SULFATE SCH ML: .5; 3 SOLUTION RESPIRATORY (INHALATION) at 20:44

## 2017-11-16 RX ADMIN — AMPICILLIN SODIUM SCH MLS/HR: 2 INJECTION, POWDER, FOR SOLUTION INTRAVENOUS at 09:35

## 2017-11-16 NOTE — PN
DATE:  11/16/17



SUBJECTIVE:  The patient is lying in the bed and immediately states that he 
feels much improved today compared to yesterday.  The fact that he has been on 
high dose Ampicillin as well as Rocephin will be continued a little bit longer 
to ensure the fact that he is getting better having failed outpatient therapy 
earlier.  Appetite has improved.  Family members are present and they state 
that he feels and looks better as well.



OBJECTIVE:  Afebrile, pulse 74, blood pressure 104/73, pulse oximetry 97% on 
room air.  LUNGS: Clear.  HEART: Tones regular.  ABDOMEN: Softer today than 
yesterday.  He is able to ambulate with improved strength and confidence today. 



LABORATORY:  White count is 5,200 with 78% neutrophils, hemoglobin 10.2.  
Chemistry shows potassium is up to 3.8, BUN 19, creatinine improved at 0.95.  
Liver enzymes normal.  C reactive protein slightly elevated at 3.4.  Beta 
natriuretic peptide 585.  Albumin 2.7.  Cultures showed no growth on blood or 
urine since collection.



ASSESSMENT: 

1.   Acute pyelonephritis right kidney with radiographic evidence of the 
infection with 

      associated obstructing ureteral stone with associated hydroureter and 

      hydronephrosis on the right.  Failed outpatient therapy on oral 
Ampicillin requiring

      parenteral therapy to assist with treatment of a potential complication 
with an 

      obstructive uropathy.

2.   Marked weakness, rule out hypothyroidism or electrolyte disturbance or 
associated

      to the infection.

3.   History of left lower lobe lung cancer under chemotherapy by infusion at 
this time

      present since 2014.

4.   Moderate dehydration requiring fluid supplementation.

5.   Fever with temperature 101 degrees at home.

6.   History of benign prostatic hypertrophy with mild bladder outlet 
obstructive 

      symptoms noted.

7.   Coronary artery disease with history of myocardial infarctions.



PLAN:  The patient will be continued tonight on the high dose Penicillin as 
well as Ceftriaxone medication.  Reevaluate in the morning at which time will 
discuss with Dr. Ayala the possibility of close outpatient followup on high 
dose Amoxicillin with close followup necessary.



#[id number]
[facility]

## 2017-11-17 VITALS — TEMPERATURE: 98.4 F | OXYGEN SATURATION: 95 % | DIASTOLIC BLOOD PRESSURE: 70 MMHG | SYSTOLIC BLOOD PRESSURE: 132 MMHG

## 2017-11-17 RX ADMIN — AMPICILLIN SODIUM SCH MLS/HR: 2 INJECTION, POWDER, FOR SOLUTION INTRAVENOUS at 04:05

## 2017-11-17 RX ADMIN — CEFTRIAXONE SCH MLS/HR: 1 INJECTION, POWDER, FOR SOLUTION INTRAMUSCULAR; INTRAVENOUS at 11:04

## 2017-11-17 RX ADMIN — ALUMINUM ZIRCONIUM TRICHLOROHYDREX GLY SCH MG: 0.2 STICK TOPICAL at 08:22

## 2017-11-17 RX ADMIN — OMEPRAZOLE SCH MG: 20 CAPSULE, DELAYED RELEASE ORAL at 06:18

## 2017-11-17 RX ADMIN — GABAPENTIN SCH MG: 300 CAPSULE ORAL at 08:22

## 2017-11-17 RX ADMIN — AMPICILLIN SODIUM SCH MLS/HR: 2 INJECTION, POWDER, FOR SOLUTION INTRAVENOUS at 09:30

## 2017-11-17 RX ADMIN — IPRATROPIUM BROMIDE AND ALBUTEROL SULFATE SCH ML: .5; 3 SOLUTION RESPIRATORY (INHALATION) at 08:48

## 2017-11-17 RX ADMIN — IPRATROPIUM BROMIDE AND ALBUTEROL SULFATE SCH ML: .5; 3 SOLUTION RESPIRATORY (INHALATION) at 12:45

## 2017-11-17 RX ADMIN — Medication PRN ML: at 14:38

## 2017-11-17 RX ADMIN — Medication PRN ML: at 04:11

## 2017-11-27 NOTE — DS
SUPERVISING PHYSICIAN:  Gautam Ayala MD



DISCHARGE DIAGNOSIS: 

1.   Right sided pyelonephritis with radiographic evidence of the infection 
with 

      associated obstructing ureteral stone with associated hydroureter and 

      hydronephrosis on the right, having failed to respond to outpatient 
treatment

      plan to include oral ampicillin, requiring initiation of parenteral 
antibiotics to

      include treatment for potential complication from obstructive uropathy 
with the

      patient having previous culture on 11/09/17 that was found to be 
enterococcus

      faecalis, sensitive to ampicillin.  

2.   Marked weakness secondary to ongoing infection, showing improvement after

      initiation of antibiotics.

3.   History of left lower lobe lung cancer under chemotherapy by infusion at 
time

      of admission, present since 2014.

4.   Moderate dehydration, improving with IV fluids.

5.   Fever with temperature 101 degrees at home secondary to #1.

6.   History of benign prostatic hypertrophy with mild bladder outlet 
obstructive 

      symptoms.

7.   Coronary artery disease with history of myocardial infarctions.



HISTORY OF PRESENT ILLNESS:  This 72 year-old white male was admitted to the 
hospital from the Emergency Room because of worsening symptoms of malaise and 
weakness.  He was diagnosed with right sided pyelonephritis with a urinary 
tract infection the Friday before admission and was started on amoxicillin, 
about 4 or 5 doses before his current admission.  He had been steadily getting 
worse since the onset of his symptoms with temperatures of 101 at home, some 
nausea, decreased appetite.  He saw Dr. Azevedo for a ureteral stone who felt that 
he would be able to pass the 2 x 3 mm stone without procedures.  He had been 
getting weaker.  The pain had eased up after some morphine was given to him 
last Friday in the Emergency Room.  In the Emergency Room he did have some 
pyuria on the urinalysis.  Evidence of some mild dehydration was noted.  
History of lung cancer for the last 3 years currently under infusion therapy 
out of the [facility].



LABORATORY:  Admission CBC showed white count 6,000 with discharge 5,200.  
Hemoglobin stable at 10.2 and hematocrit 30.2 at discharge with platelet count 
141,000.  Differential did show a left shift, but improved prior to discharge.  
Coagulation studies showed a normal PT, PT-T.  Chemistries on admission showed 
normal electrolytes and at discharge as well with potassium 3.8.  BUN 19, 
creatinine 0.95 which is improved from admission BUN of 29.  Liver functions 
were all within normal limits at discharge with initial AST showing slight 
elevation of 48.  BNP elevated at 585.  C-reactive protein was elevated at 3.4.
  TSH 0.37.  Urinalysis on admission showed trace of leukocyte esterase with 
microscopic showing 0 RBCs, 3 to 5 WBCs, no epithelial cells with rare 
bacteria.  



MICROBIOLOGY:  Urine culture at 48 hours showed no growth.  Blood cultures 
remained negative at 5 days.  



RADIOLOGY:  The patient had chest x-ray on admission and per radiologic 
interpretation of single view chest showed left subclavian Mediport remains in 
good position with the pulmonary vasculature within normal limits.  Lungs were 
normally aerated and clear.  There was no obvious pleural effusion or 
pneumothorax.  There was questionable left rib fracture, age indeterminate, 
versus overlapping density.  Renal ultrasound after admission per radiologic 
interpretation showed bilateral cysts and bilateral stones, but no evidence of 
hydronephrosis.  EKG showed sinus rhythm with PACs, no acute changes noted.  



HOSPITAL COURSE: Mr. Guillory was admitted as noted in history of present 
illness for having failed to respond to outpatient treatment plan with previous 
infection related to enterococcus faecalis.  He was started on penicillin, high 
dose, as well as ceftriaxone while in the hospital and did show good 
improvement.  It was felt he was clinically stable enough to be discharged to 
continue with outpatient treatment plan.



PLAN:  Mr. Guillory was discharged on 11/17/17 with instructions to followup 
with Dr. Ayala in 1 to 2 weeks, or sooner if needed, as well as Dr. Azevedo that 
is already scheduled.  He was encouraged to drink fluids to prevent dehydration 
and told to return to the hospital should he have any concerning symptoms.  



DISCHARGE PRESCRIPTIONS:

1.  Amoxicillin 1000 mg twice daily, #40.

2.  Align 4 mg daily.



All other medications prior to admission were continued.



DIET AT DISCHARGE:  Regular diet as tolerated.



ACTIVITY:  Increase as tolerated.



CONDITION AT DISCHARGE:  Stable and improved.



#[id number]
[facility]

## 2018-01-18 ENCOUNTER — HOSPITAL ENCOUNTER (INPATIENT)
Dept: HOSPITAL 39 - ER | Age: 73
LOS: 1 days | DRG: 189 | End: 2018-01-19
Attending: NURSE PRACTITIONER | Admitting: NURSE PRACTITIONER
Payer: MEDICARE

## 2018-01-18 VITALS — DIASTOLIC BLOOD PRESSURE: 62 MMHG | OXYGEN SATURATION: 100 % | SYSTOLIC BLOOD PRESSURE: 122 MMHG | TEMPERATURE: 96.8 F

## 2018-01-18 DIAGNOSIS — F17.210: ICD-10-CM

## 2018-01-18 DIAGNOSIS — Z95.5: ICD-10-CM

## 2018-01-18 DIAGNOSIS — Z95.1: ICD-10-CM

## 2018-01-18 DIAGNOSIS — C79.9: ICD-10-CM

## 2018-01-18 DIAGNOSIS — I25.10: ICD-10-CM

## 2018-01-18 DIAGNOSIS — J44.9: ICD-10-CM

## 2018-01-18 DIAGNOSIS — Z51.5: ICD-10-CM

## 2018-01-18 DIAGNOSIS — Z66: ICD-10-CM

## 2018-01-18 DIAGNOSIS — N40.0: ICD-10-CM

## 2018-01-18 DIAGNOSIS — K22.70: ICD-10-CM

## 2018-01-18 DIAGNOSIS — E78.5: ICD-10-CM

## 2018-01-18 DIAGNOSIS — J96.91: Primary | ICD-10-CM

## 2018-01-18 DIAGNOSIS — K21.9: ICD-10-CM

## 2018-01-18 DIAGNOSIS — C34.90: ICD-10-CM

## 2018-01-18 DIAGNOSIS — K70.0: ICD-10-CM

## 2018-01-18 RX ADMIN — MORPHINE SULFATE PRN MG: 10 INJECTION INTRAVENOUS at 21:20

## 2018-01-18 NOTE — ED.PDOC
History of Present Illness





- General


Chief Complaint: General


Stated Complaint: legs not working


Time Seen by Provider: 18 09:00





- History of Present Illness


Allergies/Adverse Reactions: 


Allergies





NO KNOWN ALLERGY Allergy (Verified 11/15/17 10:24)


 








Home Medications: 


Ambulatory Orders





Gabapentin 9 - 12 each PO DAILY 17 


Tamsulosin HCl [Flomax] 0.4 mg PO BEDTIME 17 











Past Medical History (General)





- Patient Medical History


Hx Seizures: No


Hx Stroke: No


Hx Dementia: No


Hx Asthma: No


Hx of COPD: Yes


Hx Cardiac Disorders: Yes


Hx Congestive Heart Failure: No


Hx Pacemaker: No


Hx Hypertension: No


Hx Thyroid Disease: No


Hx Diabetes: No


Hx Gastroesophageal Reflux: No


Hx Renal Disease: No


Hx Cancer: Yes - Lung


Hx of HIV: No


Hx MRSA: No


Surgical History: coronary bypass surgery





- Vaccination History


Hx Tetanus, Diphtheria Vaccination: No


Hx Influenza Vaccination: No


Hx Pneumococcal Vaccination: Yes





- Social History


Hx Tobacco Use: Yes


Hx Alcohol Use: No


Hx Substance Use: No


Hx Physical Abuse: No


Hx Emotional Abuse: No





Family Medical History





- Family History


  ** Mother


Family History: Unknown


Living Status: 





Departure





- Departure


Disposition: Discharge to Home or Self Care


Departure Forms:  ED Discharge - Pt. Copy, Patient Portal Self Enrollment


Referrals: 


Gautam Ayala MD [Primary Care Provider] - 1-2 Weeks


Home Medications: 


Ambulatory Orders





Gabapentin 9 - 12 each PO DAILY 17 


Tamsulosin HCl [Flomax] 0.4 mg PO BEDTIME 17

## 2018-01-18 NOTE — HP
SUPERVISING PHYSICIAN:  Gautam Ayala M.D.



CHIEF COMPLAINT:  Weakness and change in mental status.



HISTORY OF PRESENT ILLNESS:  This is a 72 year-old male patient who presented 
to the Emergency Room with his family member for generalized weakness, 
shortness of breath and confusion.  It had worsened by about 2:00 AM and his 
oxygen saturations when EMS arrived at his house at 77%.  In the Emergency Room
, it was down to 40%.  He was placed on oxygen.  He has a significant history 
of lung cancer that is presently not being treated.  He is a DNR.  The patient'
s wife has requested that [facility] be consulted.  [facility] was called and the patient is to be admitted as an inpatient hospice 
patient.



PAST MEDICAL HISTORY: 

1.   Alcoholic fatty liver disease.

2.   Shetty's esophagitis.

3.   Benign prostatic hypertrophy.

4.   Coronary artery disease.

5.   Gastroesophageal reflux disease.

6.   Hyperlipidemia.

7.   Metastatic lung cancer.



PAST SURGICAL HISTORY:

1.   Coronary artery bypass graft times 3 vessels.

2.   PTCA times 5.

3.   Kidney stone removal.



OUTPATIENT MEDICATIONS:  Per the EMR and awaiting verification.



ALLERGIES:  NO KNOWN DRUG ALLERGIES.



CODE STATUS:  DO NOT RESUSCITATE.



SOCIAL HISTORY:  He is .  He is a current smoker.  He also drinks 
alcohol on a regular basis.



REVIEW OF SYSTEMS:  Limited due to the patient's altered mental status.



PHYSICAL EXAMINATION: 



VITAL SIGNS:  He is afebrile, heart rate 92, blood pressure 118/69.  
Respiratory rate has been between 6 and 12.  O2 sat is in the 80s.  It is up to 
100% on a nonrebreather.



GENERAL:  This is a 72 year-old male patient who is lying in his hospital bed.  
He is in mild respiratory distress and very ill-appearing.



NECK:  Supple without mass.



RESPIRATORY:  He has agonal respirations with decreased breath sounds in all 
lung fields.



CARDIOVASCULAR:  Regular rate and rhythm.



ABDOMEN:  Soft, nondistended.  Bowel sounds are normal.



NEUROLOGIC:  He is obtunded.



LABORATORY:  There were no labs or films to report at this time.



ASSESSMENT: 

1.   End stage lung cancer being admitted to [facility].



PLAN:  We will admit the patient to the hospital.  His care will be assumed by 
[facility].  He will be admitted for end stage metastatic lung 
cancer.  Orders for care and comfort will be per [facility].  We will 
continue to follow the patient and treat as appropriate.



#[id number]
[facility]

## 2018-01-19 RX ADMIN — MORPHINE SULFATE PRN MG: 10 INJECTION INTRAVENOUS at 01:27

## 2018-03-20 NOTE — DS
SUPERVISING PHYSICIAN:  Gautam Ayala MD



DISCHARGE DIAGNOSIS: 

1.   Death due to end-stage lung cancer.



HISTORY OF PRESENT ILLNESS: This is a 72-year-old male patient who initially 
came to the Emergency Room with his family members for generalized weakness, 
shortness of breath and confusion.  His oxygen saturations with EMS were around 
77%.  In the Emergency Room, it was down to 40% and he was placed on a oxygen.  
He has a significant history of lung cancer that was presently not treated.  He 
was a DNR per the patient's wife.  She requested that he be admitted to hospice 
with [facility] as the hospice agency.  The patient was admitted to 
the hospital.  



HOSPITAL COURSE: Routine hospice orders were initiated.  He  on 18 
at 2:15 AM.  [facility] pronounced the patient's death.  



DISCHARGE PLAN:  The appropriate paperwork will be done and the patient will be 
discharged to the  home of the family's choice.



DISCHARGE MEDICATIONS:  None.



Dr. Ayala is the collaborating physician and available for consultation.  



#[id number]
[facility]